# Patient Record
Sex: FEMALE | Race: WHITE | NOT HISPANIC OR LATINO | Employment: FULL TIME | URBAN - METROPOLITAN AREA
[De-identification: names, ages, dates, MRNs, and addresses within clinical notes are randomized per-mention and may not be internally consistent; named-entity substitution may affect disease eponyms.]

---

## 2017-02-28 ENCOUNTER — GENERIC CONVERSION - ENCOUNTER (OUTPATIENT)
Dept: OTHER | Facility: OTHER | Age: 27
End: 2017-02-28

## 2017-08-11 ENCOUNTER — OFFICE VISIT (OUTPATIENT)
Dept: URGENT CARE | Age: 27
End: 2017-08-11
Payer: COMMERCIAL

## 2017-08-11 PROCEDURE — G0381 LEV 2 HOSP TYPE B ED VISIT: HCPCS | Performed by: FAMILY MEDICINE

## 2017-08-11 PROCEDURE — S9083 URGENT CARE CENTER GLOBAL: HCPCS | Performed by: FAMILY MEDICINE

## 2017-10-10 ENCOUNTER — OFFICE VISIT (OUTPATIENT)
Dept: URGENT CARE | Age: 27
End: 2017-10-10
Payer: COMMERCIAL

## 2017-10-10 PROCEDURE — G0382 LEV 3 HOSP TYPE B ED VISIT: HCPCS | Performed by: FAMILY MEDICINE

## 2017-10-10 PROCEDURE — 87430 STREP A AG IA: CPT | Performed by: FAMILY MEDICINE

## 2017-10-10 PROCEDURE — S9083 URGENT CARE CENTER GLOBAL: HCPCS | Performed by: FAMILY MEDICINE

## 2017-10-11 NOTE — PROGRESS NOTES
Assessment  1  Strep pharyngitis (034 0) (J02 0)    Plan  Sore throat    · Rapid StrepA- POC; Source:Throat; Status:Resulted - Requires Verification,Retrospective  By Protocol Authorization;   Done: 25CRM1113 11:25AM  Strep pharyngitis    · Amoxicillin 500 MG Oral Capsule; TAKE 1 CAPSULE 3 TIMES DAILY UNTIL GONE   · Drink plenty of fluids ; Status:Complete;   Done: 81JOB8453   · Resume activity to your tolerance ; Status:Complete;   Done: 83XSM8613   · Use a cool mist humidifier in the room ; Status:Complete;   Done: 44LKD4358   · We suggest that you try a probiotic supplement ; Status:Complete;   Done: 04IYH8292    Discussion/Summary  Discussion Summary:   Continue over-the-counter medications as needed for symptomatic care  Medication Side Effects Reviewed: Possible side effects of new medications were reviewed with the patient/guardian today  Understands and agrees with treatment plan: The treatment plan was reviewed with the patient/guardian  The patient/guardian understands and agrees with the treatment plan   Counseling Documentation With [facility]: The patient was counseled regarding diagnostic results,-instructions for management,-prognosis,-patient and family education,-impressions,-risks and benefits of treatment options,-importance of compliance with treatment  Follow Up Instructions: Follow Up with your Primary Care Provider in 3-4 days  If your symptoms worsen, go to the nearest [place] [address] Emergency Department  Chief Complaint  1  Cold Symptoms  Chief Complaint Free Text Note Form: c/o head congestion, productive cough, body ache, weak, with post nasal drip and sore throat since last , denies use of OTC medications  History of Present Illness  HPI: Patient is here for evaluation of sore throat, body aches, fevers, congestion, ear pressure  Hospital Based Practices Required Assessment:   Pain Assessment   the patient states they have pain   (on a scale of 0 to 10, the patient rates the pain at 6 )   Abuse And Domestic Violence Screen    Yes, the patient is safe at home -The patient states no one is hurting them  Depression And Suicide Screen  No, the patient has not had thoughts of hurting themself  No, the patient has not felt depressed in the past 7 days  Prefered Language is  english  Cold Symptoms: Veronica Guzman presents with complaints of cold symptoms  Associated symptoms include nasal congestion,-sore throat-and-swollen lymph nodes, but-no sneezing,-no runny nose,-no post nasal drainage,-no scratchy throat,-no hoarseness,-no dry cough,-no productive cough,-no facial pressure,-no facial pain,-no headache,-no plugged ear(s),-no ear pain,-no wheezing,-no shortness of breath,-no fatigue,-no weakness,-no nausea,-no vomiting,-no diarrhea,-no fever-and-no chills  Review of Systems  Focused-Female:   Constitutional: as noted in HPI    ENT: as noted in HPI  Respiratory: as noted in HPI  Active Problems  1  Acute UTI (599 0) (N39 0)   2  Asthma (493 90) (J45 909)   3  Conjunctivitis of right eye (372 30) (H10 9)   4  Hypothyroidism (244 9) (E03 9)   5  History of Mild cervical dysplasia (622 11) (N87 0)   6  Tinea corporis (110 5) (B35 4)    Past Medical History  1  History of human papillomavirus infection (V12 09) (Z86 19)   2  Hypothyroidism (244 9) (E03 9)   3  History of Mild cervical dysplasia (622 11) (N87 0)  Active Problems And Past Medical History Reviewed: The active problems and past medical history were reviewed and updated today  Family History  Mother    1  Family history of Heart Disease (V17 49)   2  Family history of Hypertension (V17 49)  Father    3  Family history of Stroke Syndrome (V17 1)  Family History    4  Family history of Heart Disease (V17 49)   5  Family history of Thyroid Disorder (V18 19)  Family History Reviewed: The family history was reviewed and updated today         Social History   · Alcohol Use (History)   · Being A Social Drinker · Current Every Day Smoker (305 1)   · Current Smoker (305 1)   · Daily Coffee Consumption (1  Cups/Day)   · Denied: History of Daily Cola Consumption (___ Cans/Day)   · Denied: History of Daily Tea Consumption (___ Cups/Day)   · Marital History - Single  Social History Reviewed: The social history was reviewed and updated today  Surgical History  1  History of Colposcopy Cervix With Biopsy(S)   2  History of Pap Smear (+) Low Grade Squamous Intraepithelial Lesion (795 03)  Surgical History Reviewed: The surgical history was reviewed and updated today  Current Meds   1  No Reported Medications  Requested for: 23IGP1665 Recorded  Medication List Reviewed: The medication list was reviewed and updated today  Allergies  1  No Known Drug Allergies  2  Seasonal    Vitals  Signs   Recorded: 88OYQ1281 11:10AM   Temperature: 98 7 F, Temporal  Heart Rate: 107  Respiration: 20  Systolic: 668  Diastolic: 74  Height: 5 ft 8 in  Weight: 179 lb   BMI Calculated: 27 22  BSA Calculated: 1 95  O2 Saturation: 97  LMP: 86Zqd1652  Pain Scale: 6    Physical Exam    Constitutional   General appearance: No acute distress, well appearing and well nourished  Eyes   Conjunctiva and lids: No swelling, erythema or discharge  Pupils and irises: Equal, round and reactive to light  Ears, Nose, Mouth, and Throat   External inspection of ears and nose: Normal     Otoscopic examination: Tympanic membranes translucent with normal light reflex  Canals patent without erythema  Nasal mucosa, septum, and turbinates: Normal without edema or erythema  -Bilateral tonsillar soft tissue swelling and erythema with exudate  Lymphatic   Palpation of lymph nodes in neck: Abnormal   bilateral anterior cervical node enlargement, but-no posterior cervical node enlargement,-no submandibular node enlargement-and-no supraclavicular node enlargement     Psychiatric   Orientation to person, place, and time: Normal     Mood and affect: Normal        Message  Return to work or school:   Georgina Crouch is under my professional care   She was seen in my office on 10/10/2017   Please excuse from work, 10/10/2017 and 10/11/2017 due to illness           Signatures   Electronically signed by : Corazon Tellez, [facility]; Oct 10 2017 11:36AM EST                       (Author)    Electronically signed by : Micheal Huff DO; Oct 10 2017 12:04PM EST                       (Co-author)

## 2018-01-10 NOTE — PROGRESS NOTES
Assessment    1  Acute UTI (599 0) (N39 0)    Plan  Acute UTI    · Sulfamethoxazole-Trimethoprim 800-160 MG Oral Tablet (Bactrim DS); TAKE 1  TABLET TWICE DAILY WITH FOOD    Discussion/Summary    I prescribed Bactrim DS - one tab po bid for 3 days   I advised the patient if symptoms persist after 3 days, she should consider following up with PCP or go to Urgent care to have urine tested  she expressed understanding  Chief Complaint  UTI  History of Present Illness  e-visit  patient c/o of UTI symptoms for 2 days  Refers to dysuria,cloudy urine and blood today  Denies fever   Review of Systems    Constitutional: No fever, no chills, feels well, no tiredness, no recent weight gain or loss  Genitourinary: dysuria  Active Problems    1  Asthma (493 90) (J45 909)   2  Hypothyroidism (244 9) (E03 9)   3  History of Mild cervical dysplasia (622 11) (N87 0)   4  Tinea corporis (110 5) (B35 4)    Past Medical History    1  History of human papillomavirus infection (V12 09) (Z86 19)   2  Hypothyroidism (244 9) (E03 9)   3  History of Mild cervical dysplasia (622 11) (N87 0)    The active problems and past medical history were reviewed and updated today  Family History  Mother    1  Family history of Heart Disease (V17 49)   2  Family history of Hypertension (V17 49)  Father    3  Family history of Stroke Syndrome (V17 1)  Family History    4  Family history of Heart Disease (V17 49)   5  Family history of Thyroid Disorder (V18 19)    Social History    · Alcohol Use (History)   · Being A Social Drinker   · Current Every Day Smoker (305 1)   · Current Smoker (305 1)   · Daily Coffee Consumption (1  Cups/Day)   · Denied: History of Daily Cola Consumption (___ Cans/Day)   · Denied: History of Daily Tea Consumption (___ Cups/Day)   · Marital History - Single    Surgical History    1  History of Colposcopy Cervix With Biopsy(S)   2   History of Pap Smear (+) Low Grade Squamous Intraepithelial Lesion (188 03)    Current Meds   1  Ketoconazole 2 % External Cream; APPLY SPARINGLY TO AFFECTED AREA(S) TWICE   DAILY; Therapy: 15XZQ6659 to (Evaluate:30Ovv9912)  Requested for: 52RSS7742; Last   Rx:29Nov2016 Ordered    The medication list was reviewed and updated today  Allergies    1  No Known Drug Allergies    2  Seasonal    Observations Made  patient c/o of bladder tenderness and lower back pain        Signatures   Electronically signed by : VAN Bah ; Feb 28 2017 10:31PM EST                       (Author)

## 2018-01-18 NOTE — MISCELLANEOUS
Message  Return to work or school:   Noni Suggs is under my professional care   She was seen in my office on 10/10/2017   Please excuse from work, 10/10/2017 and 10/11/2017 due to illness           Signatures   Electronically signed by : Issa Lopez, [facility]; Oct 10 2017 11:36AM EST                       (Author)    Electronically signed by : Issa Lopez, [facility]; Oct 10 2017 12:52PM EST

## 2018-01-18 NOTE — MISCELLANEOUS
Message  Return to work or school:   Susan Rothman is under my professional care   She was seen in my office on 11/29/2016   She is able to return to work on  11/30/2016       Adria Bridges   Electronically signed by : VAN Rosas ; Nov 29 2016 12:35PM EST                       (Author)